# Patient Record
Sex: MALE | Race: WHITE | NOT HISPANIC OR LATINO | Employment: FULL TIME | URBAN - METROPOLITAN AREA
[De-identification: names, ages, dates, MRNs, and addresses within clinical notes are randomized per-mention and may not be internally consistent; named-entity substitution may affect disease eponyms.]

---

## 2017-06-16 ENCOUNTER — OFFICE VISIT (OUTPATIENT)
Dept: FAMILY MEDICINE | Facility: CLINIC | Age: 37
End: 2017-06-16
Payer: COMMERCIAL

## 2017-06-16 ENCOUNTER — TELEPHONE (OUTPATIENT)
Dept: FAMILY MEDICINE | Facility: CLINIC | Age: 37
End: 2017-06-16

## 2017-06-16 ENCOUNTER — HOSPITAL ENCOUNTER (OUTPATIENT)
Dept: RADIOLOGY | Facility: HOSPITAL | Age: 37
Discharge: HOME | End: 2017-06-16
Attending: NURSE PRACTITIONER
Payer: COMMERCIAL

## 2017-06-16 VITALS
OXYGEN SATURATION: 98 % | HEIGHT: 72 IN | SYSTOLIC BLOOD PRESSURE: 122 MMHG | WEIGHT: 212.31 LBS | DIASTOLIC BLOOD PRESSURE: 82 MMHG | HEART RATE: 58 BPM | TEMPERATURE: 98 F | BODY MASS INDEX: 28.76 KG/M2

## 2017-06-16 DIAGNOSIS — R10.9 ABDOMINAL PAIN, UNSPECIFIED LOCATION: Primary | ICD-10-CM

## 2017-06-16 DIAGNOSIS — N50.819 TESTICLE PAIN: ICD-10-CM

## 2017-06-16 DIAGNOSIS — R10.9 ABDOMINAL PAIN, UNSPECIFIED LOCATION: ICD-10-CM

## 2017-06-16 LAB
BILIRUB SERPL-MCNC: NORMAL MG/DL
BLOOD URINE, POC: NORMAL
COLOR, POC UA: YELLOW
GLUCOSE UR QL STRIP: NORMAL
KETONES UR QL STRIP: NORMAL
LEUKOCYTE ESTERASE URINE, POC: NORMAL
NITRITE, POC UA: NORMAL
PH, POC UA: 6
PROTEIN, POC: NORMAL
SPECIFIC GRAVITY, POC UA: 1.02
UROBILINOGEN, POC UA: NORMAL

## 2017-06-16 PROCEDURE — 99999 PR PBB SHADOW E&M-NEW PATIENT-LVL IV: CPT | Mod: PBBFAC,,, | Performed by: NURSE PRACTITIONER

## 2017-06-16 PROCEDURE — 81002 URINALYSIS NONAUTO W/O SCOPE: CPT | Mod: S$GLB,,, | Performed by: NURSE PRACTITIONER

## 2017-06-16 PROCEDURE — 74020 XR ABDOMEN FLAT AND ERECT: CPT | Mod: TC,PO

## 2017-06-16 PROCEDURE — 74020 XR ABDOMEN FLAT AND ERECT: CPT | Mod: 26,,, | Performed by: RADIOLOGY

## 2017-06-16 PROCEDURE — 99204 OFFICE O/P NEW MOD 45 MIN: CPT | Mod: 25,S$GLB,, | Performed by: NURSE PRACTITIONER

## 2017-06-16 RX ORDER — IBUPROFEN 600 MG/1
600 TABLET ORAL 3 TIMES DAILY
Qty: 45 TABLET | Refills: 0 | Status: SHIPPED | OUTPATIENT
Start: 2017-06-16

## 2017-06-16 NOTE — PROGRESS NOTES
Subjective:       Patient ID: Osvaldo Galan is a 36 y.o. male.    Chief Complaint: Abdominal Pain    This patient is new to me and clinic. He presents to clinic with complaints of lower abdominal pain that radiates to left testicle. This pain has occurred intermittently for 6-7 months. He denies bowel, urinary or sexual problems/changes.     Abdominal Pain   This is a new problem. The current episode started more than 1 month ago (6-7 months ago). The problem occurs intermittently. The problem has been waxing and waning. The pain is located in the suprapubic region. The abdominal pain radiates to the scrotum. Pertinent negatives include no anorexia, arthralgias, belching, constipation, diarrhea, dysuria, flatus, frequency, headaches, hematochezia, hematuria, melena, nausea, vomiting or weight loss. The pain is aggravated by movement. The pain is relieved by recumbency. Treatments tried: ibuprofen 600mg  The treatment provided significant relief.       Vitals:    06/16/17 0816   BP: 122/82   Pulse: (!) 58   Temp: 97.9 °F (36.6 °C)     Review of Systems   Constitutional: Negative for appetite change, chills, diaphoresis, fatigue and weight loss.   HENT: Negative.  Negative for congestion, facial swelling and trouble swallowing.    Eyes: Negative.  Negative for discharge, redness and visual disturbance.   Respiratory: Negative.  Negative for cough, chest tightness, shortness of breath and wheezing.    Cardiovascular: Negative.  Negative for chest pain and palpitations.   Gastrointestinal: Positive for abdominal pain. Negative for abdominal distention, anal bleeding, anorexia, blood in stool, constipation, diarrhea, flatus, hematochezia, melena, nausea, rectal pain and vomiting.   Genitourinary: Negative.  Negative for decreased urine volume, difficulty urinating, discharge, dysuria, enuresis, flank pain, frequency, genital sores, hematuria, penile pain, penile swelling, scrotal swelling and urgency.   Musculoskeletal:  Negative.  Negative for arthralgias, back pain and neck pain.   Skin: Negative.  Negative for rash and wound.   Neurological: Negative.  Negative for dizziness, light-headedness and headaches.   Hematological: Negative.    Psychiatric/Behavioral: Negative.  Negative for confusion. The patient is not nervous/anxious.        History reviewed. No pertinent past medical history.  Objective:      Physical Exam   Constitutional: He is oriented to person, place, and time. Vital signs are normal. He appears well-developed and well-nourished.  Non-toxic appearance. He does not have a sickly appearance. He does not appear ill. No distress.   HENT:   Head: Normocephalic and atraumatic.   Right Ear: Hearing and external ear normal.   Left Ear: Hearing and external ear normal.   Nose: Nose normal.   Mouth/Throat: Uvula is midline, oropharynx is clear and moist and mucous membranes are normal.   Eyes: Conjunctivae, EOM and lids are normal. Pupils are equal, round, and reactive to light. Right eye exhibits no discharge. Left eye exhibits no discharge.   Neck: Trachea normal and normal range of motion. Neck supple. No JVD present. No tracheal deviation present.   Cardiovascular: Normal rate, regular rhythm, S1 normal, S2 normal and normal heart sounds.   No extrasystoles are present. Exam reveals no gallop, no S3, no S4, no distant heart sounds and no friction rub.    No murmur heard.  Pulmonary/Chest: Effort normal and breath sounds normal. No accessory muscle usage. No respiratory distress. He has no decreased breath sounds. He has no wheezes. He has no rhonchi. He has no rales. He exhibits no tenderness.   Abdominal: Soft. Normal appearance and bowel sounds are normal. He exhibits no distension and no mass. There is no hepatosplenomegaly. There is no tenderness. There is no rigidity, no rebound, no guarding and no CVA tenderness. No hernia. Hernia confirmed negative in the right inguinal area and confirmed negative in the left  inguinal area.   Genitourinary: Testes normal and penis normal. Cremasteric reflex is present. No phimosis, paraphimosis, hypospadias, penile erythema or penile tenderness. No discharge found.   Musculoskeletal: Normal range of motion. He exhibits no edema or deformity.   Lymphadenopathy: No inguinal adenopathy noted on the right or left side.        Right: No inguinal adenopathy present.        Left: No inguinal adenopathy present.   Neurological: He is alert and oriented to person, place, and time. He has normal strength. No sensory deficit. Coordination and gait normal.   Skin: Skin is warm, dry and intact. No lesion and no rash noted. He is not diaphoretic. No erythema. No pallor.   Psychiatric: He has a normal mood and affect. His speech is normal and behavior is normal. Judgment and thought content normal. Cognition and memory are normal.   Nursing note and vitals reviewed.      Assessment:       1. Abdominal pain, unspecified location    2. Testicle pain        Plan:       Abdominal pain, unspecified location  -     CBC auto differential; Future; Expected date: 06/16/2017  -     Comprehensive metabolic panel; Future; Expected date: 06/16/2017  -     X-Ray Abdomen Flat And Erect; Future; Expected date: 06/16/2017    Testicle pain  -     POCT urine dipstick without microscope       Reassured patient this is most likely muscular--ibuprofen sent in; will call with xray and lab results.           Return if symptoms worsen or fail to improve.

## 2017-06-16 NOTE — PATIENT INSTRUCTIONS
Muscle Strain in the Abdomen  A muscle strain is a stretching or tearing of the muscle fibers. It is also called a pulled muscle. The abdomen is protected by a thick wall of muscle in the front and sides. These muscles help with twisting and bending forward. Too much coughing, lifting heavy objects, or sudden jerking movements can sometimes cause a muscle strain in the abdomen. This causes pain that is worse when you move. The area may also feel tender or look swollen and bruised.  Home care  · Apply an ice pack over the injured area for 15 to 20 minutes every 3 to 6 hours. You should do this for the first 24 to 48 hours. You can make an ice pack by filling a plastic bag that seals at the top with ice cubes and then wrapping it with a thin towel. Be careful not to injure your skin with the ice treatments. Ice should never be applied directly to skin. Continue the use of ice packs for relief of pain and swelling as needed. After 48 hours, apply heat (warm shower or warm bath) for 15 to 20 minutes several times a day, or alternate ice and heat.  · You may use over-the-counter pain medicine to control pain, unless another pain medicine was prescribed. If you have liver or kidney disease, a stomach ulcer or GI bleeding, talk with your healthcare provider before using these medicines.  Follow-up care  Follow up with your healthcare provider, or as advised.  Call 911  Call 911 if you have:  · Weakness, lightheaded, or faint  · Chest pain  When to seek medical advice  Call your healthcare provider right away if any of these occur:  · Pain gets worse or moves to the right lower abdomen, just below the waistline  · Fever of 100.4°F (38°C) or above lasting for 24 to 48 hours  · Vomiting  · Severe abdominal pain that spreads to the back or toward the groin  · Blood in the urine  · Unexpected vaginal bleeding in women  Date Last Reviewed: 11/19/2015  © 5481-6429 [employer] [address] [place] [address], [place]  [address]. All rights reserved. This information is not intended as a substitute for professional medical care. Always follow your healthcare professional's instructions.

## 2018-10-13 ENCOUNTER — NURSE TRIAGE (OUTPATIENT)
Dept: ADMINISTRATIVE | Facility: CLINIC | Age: 38
End: 2018-10-13

## 2018-10-13 NOTE — TELEPHONE ENCOUNTER
WOULD LIKE TO KNOW BLOOD TYPE?  Office message sent to call pt Monday.   Reason for Disposition   [1] Caller requesting NON-URGENT health information AND [2] PCP's office is the best resource    Protocols used: ST INFORMATION ONLY [id number]-AH